# Patient Record
Sex: MALE | Race: BLACK OR AFRICAN AMERICAN | URBAN - METROPOLITAN AREA
[De-identification: names, ages, dates, MRNs, and addresses within clinical notes are randomized per-mention and may not be internally consistent; named-entity substitution may affect disease eponyms.]

---

## 2024-04-01 NOTE — PROGRESS NOTES
Urology Clinic Note - New Patient    Referring Provider:  No referring provider defined for this encounter.     Primary Care Provider:  No primary care provider on file.     Chief Complaint:     Prostate cancer, UTI     HPI:     Zachary Willson is a 84 year old male with history of HTN, HLD, dementia referred for prostate cancer and UTI history.    There is minimal information in care everywhere. Patient not a good historian given dementia history. He lives in nursing home; no significant records brought today and transport not aware of his history.   In CE: there is mention of prostate cancer and a history of UTI in 2022.     Pts daughter was called; she is unsure why he is seeing us. He has a history of UTI and prostate cancer; [facility]; was on Lupron at that time and PSA has been stable. Patient apparently had radiation for prostate cancer but daughter unsure of details.   [facility] and has [facility] in June and apparently todays appt was made in error.   He is unable to void right now. Per patient and daughter no active urinary complaints. Has been voiding without issue at nursing home.  No hematuria per family.     Random bladder scan 170cc.   Unable to give sample.        PSA:  No results found for: \"PSA\", \"PERCENTPSA\", \"PSAS\", \"PSAULTRA\", \"QPSA\", \"PSATOT\", \"TOTPSADX\", \"TOTPSASCREEN\"   No Cr or GFR on file.      History:   No past medical history on file.    No past surgical history on file.    No family history on file.         Medications (Active prior to today's visit):  No current outpatient medications on file.       Allergies:  Not on File      Review of Systems:   A comprehensive 10-point review of systems was completed.  Pertinent positives and negatives are noted in the the HPI.    Physical Exam:   CONSTITUTIONAL: In wheelchair   NEUROLOGIC: Alert    HEAD: Normocephalic, atraumatic  ENT: Hearing intact   RESPIRATORY: Normal respiratory effort  SKIN: No evident  rashes  ABDOMEN: Soft, non-tender, non-distended,     No results found.      Assessment & Plan:     Zachary Willson is a 84 year old male with history of HTN, HLD, dementia referred for prostate cancer and UTI history.    Minimal records available today and patient with dementia and nursing home staff not aware of history.   Discussed daughter. Has hx of prostate cancer and UTI. [facility] and will be seen there next month. Has no urinary complaints today and daughter had no concerns. She wants him to continue [facility].   I did offer ongoing management with us if they decide; however they will need to provide [facility] records. For now, family wants to proceed with [facility]. They will call us with any changes.       Thank you for this consult.    I have personally reviewed all relevant medical records, labs, and imaging.      In total, 30 minutes were spent on this patient encounter (including chart review, patient history, physical, and counseling, documentation, and communication)- this includes calling patients family for ancillary history           Timo Tejada MD  Staff Urologist  [facility]  Office: [phone]

## 2024-04-03 ENCOUNTER — OFFICE VISIT (OUTPATIENT)
Dept: SURGERY | Facility: CLINIC | Age: 84
End: 2024-04-03

## 2024-04-03 DIAGNOSIS — C61 PROSTATE CANCER (HCC): Primary | ICD-10-CM

## 2024-04-03 PROCEDURE — 99203 OFFICE O/P NEW LOW 30 MIN: CPT | Performed by: UROLOGY

## 2024-04-03 RX ORDER — LISINOPRIL 20 MG/1
20 TABLET ORAL DAILY
COMMUNITY

## 2024-04-03 RX ORDER — AMLODIPINE BESYLATE AND BENAZEPRIL HYDROCHLORIDE 5; 10 MG/1; MG/1
1 CAPSULE ORAL DAILY PRN
COMMUNITY

## 2024-04-03 RX ORDER — ATORVASTATIN CALCIUM 40 MG/1
40 TABLET, FILM COATED ORAL NIGHTLY
COMMUNITY

## 2024-04-03 RX ORDER — POTASSIUM CHLORIDE 1.5 G/1.58G
20 POWDER, FOR SOLUTION ORAL DAILY
COMMUNITY

## 2024-04-03 RX ORDER — MELATONIN
1000 DAILY
COMMUNITY

## 2024-04-03 RX ORDER — ASPIRIN 81 MG/1
81 TABLET ORAL DAILY
COMMUNITY

## 2024-04-03 RX ORDER — HYDRALAZINE HYDROCHLORIDE 50 MG/1
50 TABLET, FILM COATED ORAL 3 TIMES DAILY
COMMUNITY

## 2024-04-03 RX ORDER — FUROSEMIDE 40 MG/1
40 TABLET ORAL DAILY
COMMUNITY

## 2024-11-12 ENCOUNTER — APPOINTMENT (OUTPATIENT)
Dept: GENERAL RADIOLOGY | Facility: HOSPITAL | Age: 84
End: 2024-11-12
Attending: EMERGENCY MEDICINE
Payer: MEDICARE

## 2024-11-12 ENCOUNTER — APPOINTMENT (OUTPATIENT)
Dept: CT IMAGING | Facility: HOSPITAL | Age: 84
End: 2024-11-12
Attending: EMERGENCY MEDICINE
Payer: MEDICARE

## 2024-11-12 ENCOUNTER — HOSPITAL ENCOUNTER (OUTPATIENT)
Facility: HOSPITAL | Age: 84
Setting detail: OBSERVATION
Discharge: NURSING, REHAB OR LONG-TERM CARE | End: 2024-11-15
Attending: EMERGENCY MEDICINE | Admitting: INTERNAL MEDICINE
Payer: MEDICARE

## 2024-11-12 DIAGNOSIS — E87.0 HYPERNATREMIA: ICD-10-CM

## 2024-11-12 DIAGNOSIS — N17.9 ACUTE RENAL FAILURE, UNSPECIFIED ACUTE RENAL FAILURE TYPE (HCC): ICD-10-CM

## 2024-11-12 DIAGNOSIS — N39.0 URINARY TRACT INFECTION WITHOUT HEMATURIA, SITE UNSPECIFIED: ICD-10-CM

## 2024-11-12 DIAGNOSIS — A41.9 SEPSIS WITH HYPOTENSION (HCC): Primary | ICD-10-CM

## 2024-11-12 DIAGNOSIS — I95.9 SEPSIS WITH HYPOTENSION (HCC): Primary | ICD-10-CM

## 2024-11-12 PROBLEM — R73.9 HYPERGLYCEMIA: Status: ACTIVE | Noted: 2024-11-12

## 2024-11-12 PROBLEM — D64.9 ANEMIA: Status: ACTIVE | Noted: 2024-11-12

## 2024-11-12 LAB
ALBUMIN SERPL-MCNC: 4 G/DL (ref 3.2–4.8)
ALBUMIN/GLOB SERPL: 1.1 {RATIO} (ref 1–2)
ALP LIVER SERPL-CCNC: 79 U/L
ALT SERPL-CCNC: 15 U/L
ANION GAP SERPL CALC-SCNC: 2 MMOL/L (ref 0–18)
APTT PPP: 30.7 SECONDS (ref 23–36)
AST SERPL-CCNC: 20 U/L (ref ?–34)
BASOPHILS # BLD AUTO: 0.05 X10(3) UL (ref 0–0.2)
BASOPHILS NFR BLD AUTO: 0.6 %
BILIRUB SERPL-MCNC: 0.4 MG/DL (ref 0.2–1.1)
BILIRUB UR QL STRIP.AUTO: NEGATIVE
BUN BLD-MCNC: 57 MG/DL (ref 9–23)
CALCIUM BLD-MCNC: 9.2 MG/DL (ref 8.7–10.4)
CHLORIDE SERPL-SCNC: 117 MMOL/L (ref 98–112)
CO2 SERPL-SCNC: 28 MMOL/L (ref 21–32)
CREAT BLD-MCNC: 2.88 MG/DL
EGFRCR SERPLBLD CKD-EPI 2021: 21 ML/MIN/1.73M2 (ref 60–?)
EOSINOPHIL # BLD AUTO: 0.11 X10(3) UL (ref 0–0.7)
EOSINOPHIL NFR BLD AUTO: 1.4 %
ERYTHROCYTE [DISTWIDTH] IN BLOOD BY AUTOMATED COUNT: 14.4 %
GLOBULIN PLAS-MCNC: 3.7 G/DL (ref 2–3.5)
GLUCOSE BLD-MCNC: 150 MG/DL (ref 70–99)
GLUCOSE BLD-MCNC: 152 MG/DL (ref 70–99)
GLUCOSE UR STRIP.AUTO-MCNC: NORMAL MG/DL
GRAN CASTS #/AREA URNS LPF: PRESENT /LPF
HCT VFR BLD AUTO: 34.9 %
HGB BLD-MCNC: 11.3 G/DL
HYALINE CASTS #/AREA URNS AUTO: PRESENT /LPF
IMM GRANULOCYTES # BLD AUTO: 0.02 X10(3) UL (ref 0–1)
IMM GRANULOCYTES NFR BLD: 0.2 %
INR BLD: 1.19 (ref 0.8–1.2)
KETONES UR STRIP.AUTO-MCNC: NEGATIVE MG/DL
LACTATE SERPL-SCNC: 0.9 MMOL/L (ref 0.5–2)
LACTATE SERPL-SCNC: 2.2 MMOL/L (ref 0.5–2)
LEUKOCYTE ESTERASE UR QL STRIP.AUTO: 250
LYMPHOCYTES # BLD AUTO: 1.43 X10(3) UL (ref 1–4)
LYMPHOCYTES NFR BLD AUTO: 17.8 %
MCH RBC QN AUTO: 29.8 PG (ref 26–34)
MCHC RBC AUTO-ENTMCNC: 32.4 G/DL (ref 31–37)
MCV RBC AUTO: 92.1 FL
MONOCYTES # BLD AUTO: 0.51 X10(3) UL (ref 0.1–1)
MONOCYTES NFR BLD AUTO: 6.3 %
NEUTROPHILS # BLD AUTO: 5.93 X10 (3) UL (ref 1.5–7.7)
NEUTROPHILS # BLD AUTO: 5.93 X10(3) UL (ref 1.5–7.7)
NEUTROPHILS NFR BLD AUTO: 73.7 %
NITRITE UR QL STRIP.AUTO: NEGATIVE
OSMOLALITY SERPL CALC.SUM OF ELEC: 323 MOSM/KG (ref 275–295)
PH UR STRIP.AUTO: 5.5 [PH] (ref 5–8)
PLATELET # BLD AUTO: 177 10(3)UL (ref 150–450)
POTASSIUM SERPL-SCNC: 5 MMOL/L (ref 3.5–5.1)
PROT SERPL-MCNC: 7.7 G/DL (ref 5.7–8.2)
PROT UR STRIP.AUTO-MCNC: 50 MG/DL
PROTHROMBIN TIME: 15.3 SECONDS (ref 11.6–14.8)
Q-T INTERVAL: 422 MS
QRS DURATION: 90 MS
QTC CALCULATION (BEZET): 474 MS
R AXIS: -43 DEGREES
RBC # BLD AUTO: 3.79 X10(6)UL
RBC #/AREA URNS AUTO: >10 /HPF
SARS-COV-2 RNA RESP QL NAA+PROBE: DETECTED
SODIUM SERPL-SCNC: 147 MMOL/L (ref 136–145)
SP GR UR STRIP.AUTO: 1.02 (ref 1–1.03)
T AXIS: -1 DEGREES
TROPONIN I SERPL HS-MCNC: 10 NG/L
UROBILINOGEN UR STRIP.AUTO-MCNC: NORMAL MG/DL
VENTRICULAR RATE: 76 BPM
WBC # BLD AUTO: 8.1 X10(3) UL (ref 4–11)
WBC #/AREA URNS AUTO: >50 /HPF

## 2024-11-12 PROCEDURE — 99223 1ST HOSP IP/OBS HIGH 75: CPT | Performed by: INTERNAL MEDICINE

## 2024-11-12 PROCEDURE — 70450 CT HEAD/BRAIN W/O DYE: CPT | Performed by: EMERGENCY MEDICINE

## 2024-11-12 PROCEDURE — 71045 X-RAY EXAM CHEST 1 VIEW: CPT | Performed by: EMERGENCY MEDICINE

## 2024-11-12 RX ORDER — ACETAMINOPHEN 500 MG
500 TABLET ORAL EVERY 4 HOURS PRN
Status: DISCONTINUED | OUTPATIENT
Start: 2024-11-12 | End: 2024-11-15

## 2024-11-12 RX ORDER — SODIUM PHOSPHATE, DIBASIC AND SODIUM PHOSPHATE, MONOBASIC 7; 19 G/230ML; G/230ML
1 ENEMA RECTAL ONCE AS NEEDED
Status: DISCONTINUED | OUTPATIENT
Start: 2024-11-12 | End: 2024-11-15

## 2024-11-12 RX ORDER — METOCLOPRAMIDE HYDROCHLORIDE 5 MG/ML
5 INJECTION INTRAMUSCULAR; INTRAVENOUS EVERY 8 HOURS PRN
Status: DISCONTINUED | OUTPATIENT
Start: 2024-11-12 | End: 2024-11-15

## 2024-11-12 RX ORDER — ATORVASTATIN CALCIUM 40 MG/1
40 TABLET, FILM COATED ORAL NIGHTLY
Status: DISCONTINUED | OUTPATIENT
Start: 2024-11-12 | End: 2024-11-15

## 2024-11-12 RX ORDER — ENOXAPARIN SODIUM 100 MG/ML
30 INJECTION SUBCUTANEOUS DAILY
Status: DISCONTINUED | OUTPATIENT
Start: 2024-11-13 | End: 2024-11-15

## 2024-11-12 RX ORDER — POLYETHYLENE GLYCOL 3350 17 G/17G
17 POWDER, FOR SOLUTION ORAL DAILY PRN
Status: DISCONTINUED | OUTPATIENT
Start: 2024-11-12 | End: 2024-11-15

## 2024-11-12 RX ORDER — BISACODYL 10 MG
10 SUPPOSITORY, RECTAL RECTAL
Status: DISCONTINUED | OUTPATIENT
Start: 2024-11-12 | End: 2024-11-15

## 2024-11-12 RX ORDER — SODIUM CHLORIDE 9 MG/ML
INJECTION, SOLUTION INTRAVENOUS CONTINUOUS
Status: DISCONTINUED | OUTPATIENT
Start: 2024-11-12 | End: 2024-11-13

## 2024-11-12 RX ORDER — ASPIRIN 81 MG/1
81 TABLET ORAL DAILY
Status: DISCONTINUED | OUTPATIENT
Start: 2024-11-13 | End: 2024-11-15

## 2024-11-12 RX ORDER — SENNOSIDES 8.6 MG
17.2 TABLET ORAL NIGHTLY PRN
Status: DISCONTINUED | OUTPATIENT
Start: 2024-11-12 | End: 2024-11-15

## 2024-11-12 RX ORDER — ONDANSETRON 2 MG/ML
4 INJECTION INTRAMUSCULAR; INTRAVENOUS EVERY 6 HOURS PRN
Status: DISCONTINUED | OUTPATIENT
Start: 2024-11-12 | End: 2024-11-15

## 2024-11-12 NOTE — ED QUICK NOTES
Patient arrives via EMS from [place]. Patient has a history of dementia,  but is typically alert and in a wheelchair. EMS states they were called for the unconscious person, with profound hypotension at 50/30 upon their arrival. Two Ivs initated and IV fluids started at bolus rate. Patient became more alert while en route. Patient knows his name, first and last. Doesn't provide much history, looks around upon arrival. Per EMS, patient was hypoxic at 86% RA upon arrival, was placed on 2L O2 via NC. Patient appears to have contractures of BLE. Diapered.

## 2024-11-12 NOTE — ED PROVIDER NOTES
Patient Seen in: [facility] Emergency Department      History     Chief Complaint   Patient presents with    Hypotension     Stated Complaint: hypotensive, ams    Subjective:   HPI      Patient presents with altered mental status and hypotension.  The patient is a resident at [facility].  He is normally awake and conversant.  He was seen earlier in the day and seemed to be his normal self.  He was found 20 minutes prior to arrival to be unresponsive and hypotensive.  He was administered IV fluid by EMS send his mental status is improved but he remains confused.  There is no other information available.    Objective:     Past Medical History:    Adult failure to thrive    CAD (coronary artery disease)    CKD (chronic kidney disease) stage 3, GFR 30-59 ml/min (HCC)    Dementia (HCC)    Osteoarthritis    Prostate cancer (HCC)    PTSD (post-traumatic stress disorder)    Thrombocytopenia (HCC)              History reviewed. No pertinent surgical history.             Social History     Socioeconomic History    Marital status: Life Partner   Tobacco Use    Smoking status: Unknown   Substance and Sexual Activity    Alcohol use: Not Currently    Drug use: Never     Social Drivers of Health      Received from [facility], [facility]    Housing Stability                  Physical Exam     ED Triage Vitals [11/12/24 1257]   /64   Pulse 75   Resp 19   Temp 98 °F (36.7 °C)   Temp src Oral   SpO2 100 %   O2 Device Nasal cannula       Current Vitals:   Vital Signs  BP: 102/61  Pulse: 61  Resp: 15  Temp: 98 °F (36.7 °C)  Temp src: Oral  MAP (mmHg): 74    Oxygen Therapy  SpO2: 96 %  O2 Device: Nasal cannula  O2 Flow Rate (L/min): 2 L/min        Physical Exam  General: Alert and oriented x1, follows commands, in no acute distress.  HEENT: Normocephalic, atraumatic, pupils equal round and reactive to light, oropharynx clear, mucous membranes dry.  Neck: Supple.  Cardiovascular: Regular  rate and rhythm.  Respiratory: Lungs clear to auscultation.  Abdomen: Soft, nontender, no rebound or guarding, normal active bowel sounds, no CVA tenderness.  Extremities: No edema, slightly contracted lower extremities, intact pulses in all extremities.  Skin: Warm and dry.  Neurologic: Cranial nerves intact.  Moves all extremities to command, slightly weaker  in the right hand as compared to the left, unable to lift either leg off the bed, moves toes up and down to command.    ED Course     Labs Reviewed   CBC WITH DIFFERENTIAL WITH PLATELET - Abnormal; Notable for the following components:       Result Value    RBC 3.79 (*)     HGB 11.3 (*)     HCT 34.9 (*)     All other components within normal limits   COMP METABOLIC PANEL (14) - Abnormal; Notable for the following components:    Glucose 152 (*)     Sodium 147 (*)     Chloride 117 (*)     BUN 57 (*)     Creatinine 2.88 (*)     Calculated Osmolality 323 (*)     eGFR-Cr 21 (*)     Globulin  3.7 (*)     All other components within normal limits   PROTHROMBIN TIME (PT) - Abnormal; Notable for the following components:    PT 15.3 (*)     All other components within normal limits   LACTIC ACID, PLASMA - Abnormal; Notable for the following components:    Lactic Acid 2.2 (*)     All other components within normal limits   URINALYSIS, ROUTINE - Abnormal; Notable for the following components:    Urine Color Tessa (*)     Clarity Urine Ex.Turbid (*)     Blood Urine 3+ (*)     Protein Urine 50 (*)     Leukocyte Esterase Urine 250 (*)     WBC Urine >50 (*)     RBC Urine >10 (*)     Squamous Epi. Cells Few (*)     Hyaline Casts Present (*)     Granular Casts Present (*)     All other components within normal limits    Narrative:     reviewed   POCT GLUCOSE - Abnormal; Notable for the following components:    POC Glucose 150 (*)     All other components within normal limits   RAPID SARS-COV-2 BY PCR - Abnormal; Notable for the following components:    Rapid SARS-CoV-2 by  PCR Detected (*)     All other components within normal limits   PTT, ACTIVATED - Normal   TROPONIN I HIGH SENSITIVITY - Normal   LACTIC ACID REFLEX POST POSTIVE - Normal   RAINBOW DRAW LAVENDER   RAINBOW DRAW LIGHT GREEN   RAINBOW DRAW BLUE   BLOOD CULTURE   BLOOD CULTURE     EKG    Rate, intervals and axes as noted on EKG Report.  Rate: 76  Rhythm: Baseline artifact noted, suspect sinus rhythm  Reading: Left axis deviation, septal Q waves, no acute ischemic abnormality         I personally reviewed the chest films and no focal infiltrate noted.     CT BRAIN OR HEAD (CPT=70450)    Result Date: 11/12/2024  PROCEDURE:  CT BRAIN OR HEAD (56662)  COMPARISON:  None.  INDICATIONS:  unresponsive, leg weakness  TECHNIQUE:  Noncontrast CT scanning is performed through the brain. Dose reduction techniques were used. Dose information is transmitted to the ACR (American College of Radiology) NRDR (National Radiology Data Registry) which includes the Dose Index Registry.  PATIENT STATED HISTORY: (As transcribed by Technologist)  Patient arrived to ER unresponsive and hypotensive.    FINDINGS: Diffuse volume loss is noted.  Ventricles are prominent in size given the degree of volume loss.  Normal pressure hydrocephalus cannot be excluded.  There is no midline shift or mass-effect.  The basal cisterns are patent.  The gray-white matter differentiation is intact.  There is no acute intracranial hemorrhage or extra-axial fluid collection.  Hypodensities in the periventricular subcortical white matter is compatible chronic small vessel disease  There is no evident fracture.  The visualized paranasal sinuses and mastoid air cells are unremarkable.             CONCLUSION:  Ventriculomegaly appears increased given the degree of volume loss.  Normal pressure hydrocephalus cannot be excluded.  Correlate clinically..    LOCATION:  [facility]   Dictated by (CST): Alex Smith MD on 11/12/2024 at 3:33 PM     Finalized by (CST): Alex Smith,  MD on 11/12/2024 at 3:37 PM       XR CHEST AP PORTABLE  (CPT=71045)    Result Date: 11/12/2024  PROCEDURE:  XR CHEST AP PORTABLE  (CPT=71045)  TECHNIQUE:  AP chest radiograph was obtained.  COMPARISON:  None.  INDICATIONS:  hypotensive, ams  PATIENT STATED HISTORY: (As transcribed by Technologist)  Per patients daughter - Patient is not being very responsive and not acting like himself. Patient also has low blood pressure.    FINDINGS:  The patient is slightly rotated to the right.  The heart size is within the limits of normal.  There is a tortuous, ectatic thoracic aorta with aortic calcification.  There is mild subsegmental atelectasis or scarring seen at the left lung base.  Scattered granulomatous calcified nodules are seen bilaterally.  Pleural plaques are calcified and seen bilaterally.  There is no pleural effusion or pneumothorax.  There is no pulmonary edema.  Degenerative changes are seen in the spine and both shoulders.            CONCLUSION:  1. Calcified pleural plaques are noted which could reflect changes related to asbestos related disease.  No pleural effusion or pulmonary edema. 2. Old granulomatous disease.  3. Atheromatous changes within the aorta.    LOCATION:  [facility]      Dictated by (CST): Buck Diamond MD on 11/12/2024 at 2:05 PM     Finalized by (CST): Buck Diamond MD on 11/12/2024 at 2:07 PM        Medications   sodium chloride 0.9 % IV bolus 2,178 mL (0 mL Intravenous Stopped 11/12/24 1630)   sodium chloride 0.9 % IV bolus 1,000 mL (0 mL Intravenous Stopped 11/12/24 1320)   cefTRIAXone (Rocephin) 2 g in sodium chloride 0.9% 100 mL IVPB-ADDV (0 g Intravenous Stopped 11/12/24 1530)     The patient was started on a normal saline bolus given his hypotension.  His daughter arrived and gave some additional history.  She states that he had just come out of quarantine on Sunday and she saw him that day.  She states that he was more alert at that time.  She was concerned because he  seemed to be just in bed all of the time when he was on quarantine and maybe they were checking on him as often.  He does have some baseline confusion but he seems more weak and less talkative than usual.  She states that he does have more weakness in his legs than his arms at baseline and always needs help to transfer.  He does have some degree of contraction in his legs.  Given that the patient did not seem to have an acute focal neurologic deficit he was not called as a stroke.  He was given fluids and his sepsis markers came back positive.  He appears to have a urinary tract infection and has been given IV Rocephin.  His blood pressure appears to be improving with fluids.     MDM      Patient presents with decreased responsiveness and hypotension.  Differential diagnosis includes but is not limited to intracranial hemorrhage, dehydration and sepsis.  The patient has multiple abnormalities on his chemistry panel including hypernatremia, hyperchloremia, acute renal failure and hyperglycemia.  His cardiac enzymes have come back normal.  He has a mild anemia but no leukocytosis.  His lactic acid level is elevated.  He does not have any focal pneumonia on chest x-ray.  His urine does look consistent with infection.  The patient seems to be improving in terms of his blood pressure and mentation with fluids.  He will be admitted to Dr. Duarte from the hospitalist service for close observation and further treatment.  We have discussed the case.  Daughter was updated on the findings and plan of care and is in agreement.    Admission disposition: 11/12/2024  4:00 PM           Medical Decision Making      Disposition and Plan     Clinical Impression:  1. Sepsis with hypotension (HCC)    2. Urinary tract infection without hematuria, site unspecified    3. Hypernatremia    4. Acute renal failure, unspecified acute renal failure type (HCC)         Disposition:  Admit  11/12/2024  4:00 pm    Follow-up:  No follow-up provider  specified.        Medications Prescribed:  Current Discharge Medication List        A total of 32 minutes of critical care time (exclusive of billable procedures) was administered to manage the patient's unstable vital signs due to his hypotension.  This involved direct patient intervention, complex decision making, and/or extensive discussions with the patient, family, and clinical staff.       Supplementary Documentation:     [facility]   part of [facility]      Sepsis Reassessment Note    BP 93/56   Pulse 66   Temp 98 °F (36.7 °C) (Oral)   Resp 17   Wt 72.6 kg   SpO2 94%      I completed the sepsis reassessment at 17:00    Cardiac:  Regularity: Regular  Rate: Normal  Heart Sounds: S1,S2    Lungs:   Right: Diminished  Left: Diminished    Peripheral Pulses:  Radial: Right 2+ or Left 2+      Capillary Refill:  <3 Secs    Skin:  Temp/Moisture: Warm and Dry  Color: Normal      Mireya CHAVO Lucas MD  11/12/2024  2:47 PM       Hospital Problems       Present on Admission  Date Reviewed: 4/3/2024            ICD-10-CM Noted POA    * (Principal) Sepsis with hypotension (HCC) A41.9, I95.9 11/12/2024 Unknown    Anemia D64.9 11/12/2024 Yes    Hyperglycemia R73.9 11/12/2024 Yes

## 2024-11-12 NOTE — ED INITIAL ASSESSMENT (HPI)
From [place]. Normal, awake and talking 2 hours ago. Found to be hypotensive about 20 minutes PTA. 50/30 for EMS. Improved with IV fluids.

## 2024-11-12 NOTE — ED QUICK NOTES
Patient incontinent of stool and urine in diaper. Has pressure ulcer on coccyx with some skin breakdown. Diaper changed. Patient straight-nicholas'd of brown urine with brown sediment.   Patient's daughter arrives, states the patient came out of quarantine at NH on Sunday for Covid.

## 2024-11-13 LAB
ALBUMIN SERPL-MCNC: 3.5 G/DL (ref 3.2–4.8)
ALBUMIN/GLOB SERPL: 1 {RATIO} (ref 1–2)
ALP LIVER SERPL-CCNC: 70 U/L
ALT SERPL-CCNC: 11 U/L
ANION GAP SERPL CALC-SCNC: 3 MMOL/L (ref 0–18)
AST SERPL-CCNC: 16 U/L (ref ?–34)
BILIRUB SERPL-MCNC: 0.4 MG/DL (ref 0.2–1.1)
BUN BLD-MCNC: 47 MG/DL (ref 9–23)
CALCIUM BLD-MCNC: 9.2 MG/DL (ref 8.7–10.4)
CHLORIDE SERPL-SCNC: 121 MMOL/L (ref 98–112)
CO2 SERPL-SCNC: 25 MMOL/L (ref 21–32)
CREAT BLD-MCNC: 2.09 MG/DL
EGFRCR SERPLBLD CKD-EPI 2021: 31 ML/MIN/1.73M2 (ref 60–?)
ERYTHROCYTE [DISTWIDTH] IN BLOOD BY AUTOMATED COUNT: 14.3 %
GLOBULIN PLAS-MCNC: 3.6 G/DL (ref 2–3.5)
GLUCOSE BLD-MCNC: 100 MG/DL (ref 70–99)
HCT VFR BLD AUTO: 33.9 %
HGB BLD-MCNC: 10.7 G/DL
MAGNESIUM SERPL-MCNC: 2.3 MG/DL (ref 1.6–2.6)
MCH RBC QN AUTO: 29 PG (ref 26–34)
MCHC RBC AUTO-ENTMCNC: 31.6 G/DL (ref 31–37)
MCV RBC AUTO: 91.9 FL
OSMOLALITY SERPL CALC.SUM OF ELEC: 320 MOSM/KG (ref 275–295)
PHOSPHATE SERPL-MCNC: 3.7 MG/DL (ref 2.4–5.1)
PLATELET # BLD AUTO: 154 10(3)UL (ref 150–450)
POTASSIUM SERPL-SCNC: 4.5 MMOL/L (ref 3.5–5.1)
PROT SERPL-MCNC: 7.1 G/DL (ref 5.7–8.2)
RBC # BLD AUTO: 3.69 X10(6)UL
SODIUM SERPL-SCNC: 149 MMOL/L (ref 136–145)
WBC # BLD AUTO: 5.4 X10(3) UL (ref 4–11)

## 2024-11-13 PROCEDURE — 99232 SBSQ HOSP IP/OBS MODERATE 35: CPT | Performed by: INTERNAL MEDICINE

## 2024-11-13 PROCEDURE — 99497 ADVNCD CARE PLAN 30 MIN: CPT | Performed by: INTERNAL MEDICINE

## 2024-11-13 RX ORDER — DEXTROSE MONOHYDRATE 50 MG/ML
INJECTION, SOLUTION INTRAVENOUS CONTINUOUS
Status: DISCONTINUED | OUTPATIENT
Start: 2024-11-13 | End: 2024-11-15

## 2024-11-13 RX ORDER — DOXEPIN HYDROCHLORIDE 50 MG/1
1 CAPSULE ORAL DAILY
Status: DISCONTINUED | OUTPATIENT
Start: 2024-11-13 | End: 2024-11-15

## 2024-11-13 NOTE — PLAN OF CARE
Problem: Patient/Family Goals  Goal: Patient/Family Long Term Goal  Description: Patient's Long Term Goal: discharge home    Interventions:  - Consult to Hosp  - IVF, IV Abx  - Maintain stable BP  - NPO until speech eval  - PT/OT eval   - See additional Care Plan goals for specific interventions  Outcome: Progressing  Goal: Patient/Family Short Term Goal  Description: Patient's Short Term Goal: maintain stable BP     Interventions:   - Consult to Hosp  - IVF, IV Abx  - NPO until speech eval  - PT/OT eval  - Pain management   - See additional Care Plan goals for specific interventions  Outcome: Progressing     Problem: PAIN - ADULT  Goal: Verbalizes/displays adequate comfort level or patient's stated pain goal  Description: INTERVENTIONS:  - Encourage pt to monitor pain and request assistance  - Assess pain using appropriate pain scale  - Administer analgesics based on type and severity of pain and evaluate response  - Implement non-pharmacological measures as appropriate and evaluate response  - Consider cultural and social influences on pain and pain management  - Manage/alleviate anxiety  - Utilize distraction and/or relaxation techniques  - Monitor for opioid side effects  - Notify MD/LIP if interventions unsuccessful or patient reports new pain  - Anticipate increased pain with activity and pre-medicate as appropriate  Outcome: Progressing     Problem: RISK FOR INFECTION - ADULT  Goal: Absence of fever/infection during anticipated neutropenic period  Description: INTERVENTIONS  - Monitor WBC  - Administer growth factors as ordered  - Implement neutropenic guidelines  Outcome: Progressing     Problem: SAFETY ADULT - FALL  Goal: Free from fall injury  Description: INTERVENTIONS:  - Assess pt frequently for physical needs  - Identify cognitive and physical deficits and behaviors that affect risk of falls.  - [place] fall precautions as indicated by assessment.  - Educate pt/family on patient safety including  physical limitations  - Instruct pt to call for assistance with activity based on assessment  - Modify environment to reduce risk of injury  - Provide assistive devices as appropriate  - Consider OT/PT consult to assist with strengthening/mobility  - Encourage toileting schedule  Outcome: Progressing     Problem: DISCHARGE PLANNING  Goal: Discharge to home or other facility with appropriate resources  Description: INTERVENTIONS:  - Identify barriers to discharge w/pt and caregiver  - Include patient/family/discharge partner in discharge planning  - Arrange for needed discharge resources and transportation as appropriate  - Identify discharge learning needs (meds, wound care, etc)  - Arrange for interpreters to assist at discharge as needed  - Consider post-discharge preferences of patient/family/discharge partner  - Complete POLST form as appropriate  - Assess patient's ability to be responsible for managing their own health  - Refer to Case Management Department for coordinating discharge planning if the patient needs post-hospital services based on physician/LIP order or complex needs related to functional status, cognitive ability or social support system  Outcome: Progressing

## 2024-11-13 NOTE — OCCUPATIONAL THERAPY NOTE
OCCUPATIONAL THERAPY EVALUATION - INPATIENT    Room Number: 512/512-A  Evaluation Date: 11/13/2024     Type of Evaluation: Initial  Presenting Problem: sepsis with hypotension, UTI    Physician Order: IP Consult to Occupational Therapy  Reason for Therapy:  ADL/IADL Dysfunction and Discharge Planning  History related to admission: Patient with recent COVID-19 infection presented from [facility] facility with decreased responsiveness. Found to have sepsis and UTI.    OCCUPATIONAL THERAPY ASSESSMENT   Patient is a 84 year old male admitted on 11/12/2024 with Presenting Problem: sepsis with hypotension, UTI. Co-Morbidities : PTSD, CKD, CAD,  dementia, h/o prostate cancer  Patient is currently functioning at baseline with toileting, bathing, upper body dressing, lower body dressing, grooming, bed mobility, transfers, static sitting balance, and dynamic sitting balance.  Prior to admission, patient's baseline is max to total assist for ADLs and transfers per EMR.   Patient met all OT goals at baseline ( MAX to DEP ) level.  Patient reports no further questions/concerns at this time.     Patient will benefit from continued skilled OT Services return to long term care with restorative therapies    Recommendations for nursing staff:   Transfers: sit to stand lift, 2 ppl for safety  Toileting location: Toilet    EVALUATION SESSION:  Patient at start of session: supine    FUNCTIONAL TRANSFER ASSESSMENT  Sit to Stand: Edge of Bed  Edge of Bed: Maximum Assist    BED MOBILITY  Supine to Sit : Maximum Assist  Sit to Supine (OT): Maximum Assist  Scooting: MAX A    BALANCE ASSESSMENT  Static Sitting: Maximum Assist    FUNCTIONAL ADL ASSESSMENT  Grooming Seated: Maximum Assist    ACTIVITY TOLERANCE: BP stable with bed mobility and transfer to chair mode           BP: 133/73  BP Location: Left arm  BP Method: Automatic  Patient Position: Semi-Krishna    O2 SATURATIONS       COGNITION  Arousal/Alertness:  delayed responses to stimuli,  inconsistent responses to stimuli, and delays in processing ; also does not answer approx 75% of questions  Attention Span:  difficulty attending to directions  Orientation Level:  oriented to person  Following Commands:  follows one-step commands inconsistently  Initiation: cues to initiate tasks and hand over hand to initiate tasks  Motor Planning: impaired  Awareness of Errors:  decreased awareness of errors   Awareness of Deficits:  decreased awareness of deficits      COGNITION ASSESSMENTS     Upper Extremity:   ROM: Impaired bilateral shoulder flexion to approx 1/2 range; impaired end range elbow extension/ ( ?mild contractures); able to supinate/pronate, wrists and fingers grossly WFL  Strength: difficult to assess due to patient's cognition; grossly 3-/5 shoulders, 4-/5  and elbows  Coordination:  Gross motor: impaired   Fine motor: impaired; tremors  Sensation:  no evidence of deficits; patient unable to report    EDUCATION PROVIDED  Patient Education : Functional Transfer Techniques; Role of Occupational Therapy; Plan of Care; Posture/Positioning  Patient's Response to Education: Demonstrates Poor Carry Over to Information    Equipment used: gait belt, RW, bed functions  Demonstrates functional use    Therapist comments: Patient needed significant increased time for all processing and responds to instructions or questions approx 25% of time. Assisted patient with supine to sit, scooting hips, and and sit to stand x2 at RW. Patient able to initiate lifting BLEs into supine. DEP A to boost up in bed. BEd placed in chair mode. BP stable. Patient able to grasp tissue and wipe nose with set-up and incr time.     Patient End of Session: RN aware of session/findings;Call light within reach;Needs met;In bed;Hospital anti-slip socks;All patient questions and concerns addressed;Alarm set;Other (Comment) (bed in chair position)    OCCUPATIONAL PROFILE    HOME SITUATION  Type of Home: Skilled nursing facility  ([facility])  Home Layout: One level  Lives With: Staff 24 hours                     Drives: No       Prior Level of Function: Per EMR, patient has needed at least MAX A for ADLs since June 2022. He resides at [facility]. Patient is a poor historian. He states that he stays in bed and does not get into a WC at [facility]. This writer attempted to call facility but there was no answer.    SUBJECTIVE  Flat affect, cooperative, mostly non-verbal    PAIN ASSESSMENT  Rating: Other (Comment) (denied pain, did not show evidence of pain)          OBJECTIVE  Precautions: Bed/chair alarm  Fall Risk: High fall risk    WEIGHT BEARING RESTRICTION       AM-PAC ‘6-Clicks’ Inpatient Daily Activity Short Form  -   Putting on and taking off regular lower body clothing?: A Lot  -   Bathing (including washing, rinsing, drying)?: A Lot  -   Toileting, which includes using toilet, bedpan or urinal? : A Lot  -   Putting on and taking off regular upper body clothing?: A Lot  -   Taking care of personal grooming such as brushing teeth?: A Lot  -   Eating meals?: A Lot    AM-PAC Score:  Score: 12  Approx Degree of Impairment: 66.57%  Standardized Score (AM-PAC Scale): 30.6    ADDITIONAL TESTS     NEUROLOGICAL FINDINGS      PLAN   Patient has been evaluated and presents with no skilled Occupational Therapy needs at this time.  Patient discharged from Occupational Therapy services.  Please re-order if a new functional limitation presents during this admission.       Patient Evaluation Complexity Level:   Occupational Profile/Medical History MODERATE - Expanded review of history including review of medical or therapy record   Specific performance deficits impacting engagement in ADL/IADL MODERATE  3 - 5 performance deficits   Client Assessment/Performance Deficits MODERATE - Comorbidities and min to mod modifications of tasks    Clinical Decision Making LOW - Analysis of occupational profile, problem-focused  assessments, limited treatment options    Overall Complexity LOW     OT Session Time: 25 minutes    Therapeutic Activity: 22 minutes

## 2024-11-13 NOTE — PROGRESS NOTES
Oriented to self. Only following simple, 1 step commands. Room air. Lethargic. Regular diet/nectar thick liquids. Rounded on Q hour and PRN.

## 2024-11-13 NOTE — PROGRESS NOTES
Patient A&O X 1. Delayed responses. Room air. IV Abx. NSR/SB on Tele. Lovenox. Primofit and briefed. . IVF. Sacral wound, mepilex in place. Patient failed dysphagia screening, NPO until speech eval. Wheelchair bound at baseline per daughter. PT/OT to see. No further needs at this time.

## 2024-11-13 NOTE — SPIRITUAL CARE NOTE
Spiritual Care Visit Note    Patient Name: Zachary Willson Date of Spiritual Care Visit: 24   : 1/3/1940 Primary Dx: Sepsis with hypotension (HCC)       Referred By:      Spiritual Care Taxonomy:         Methods: Collaborate with care team member    Interventions: Active listening;Ask guided questions    Visit Type/Summary:     - Spiritual Care: Consulted with RN prior to visit. Provided information regarding how to contact Spiritual Care and left a Spiritual Care information card.  remains available as needed for follow up. Spoke with the RN and was informed that the patient is unable to complete a POA form. Patient is  to engage in conversation. There was no family present.      Spiritual Care support can be requested via an Epic consult. For urgent/immediate needs, please contact the On Call  at: Armando: ext [phone]           Chaplain Resident Ange Canela MA

## 2024-11-13 NOTE — PROGRESS NOTES
NURSING ADMISSION NOTE      Patient admitted via Cart  Oriented to room.  Safety precautions initiated.  Bed in low position.  Call light in reach.    Admission toma complete. PTA med list complete. From [facility].  Pt A&Ox1, Hx dementia. RA. Tele, NSR, pvcs. Incontinent x2, briefed. Blood on brief from st cath. Straight cathed in ED. Bladder scans?  Wheelchair bound at baseline. Redness/wound on sacrum, inserted photo into media. Regular diet at home. Awaiting orders. MD notified.

## 2024-11-13 NOTE — DIETARY NOTE
[facility]   part of [facility]    NUTRITION ASSESSMENT    Unable to diagnose malnutrition criteria at this time.    NUTRITION INTERVENTION:    RD nutrition Care Plan- See RD nutrition assessment for additional recommendations  Meal and Snacks - Monitor and encourage adequate PO intake.   Medical Food Supplements - Magic Cup BID. Rationale/use for oral supplements discussed.  Vitamin and Mineral Supplements - adding Multivitamin with minerals    PATIENT STATUS: 11/13/24 Pt is an 84 year old male admitted with sepsis. RD screened due to wound. No family present upon visit. +dementia. Pt confused. Coccyx wound, unstaged. RD added Magic Cup Bid. Pt with wasting present. No wt history or po info available. RD to follow for po, ons, wts.     PMH: HTN, dementia, CKD stage 3, prostate cancer    ANTHROPOMETRICS:  Ht: 180.3 cm (5' 10.98\")  Wt: 66 kg (145 lb 6.4 oz).   BMI: Body mass index is 20.29 kg/m².  IBW: 76 kg    WEIGHT HISTORY:   Wt Readings from Last 10 Encounters:   11/12/24 66 kg (145 lb 6.4 oz)      NUTRITION:  Diet:       Procedures    Regular/General diet Fluid Consistency: Nectar Thick / Mildly Thick Liquids; Is Patient on Accuchecks? No      Food Allergies: No  Cultural/Ethnic/Cheondoism Preferences Addressed: Yes    Percent Meals Eaten (last 3 days)       None          GI system review: WNL    Skin and wounds: coccyx    NUTRITION RELATED PHYSICAL FINDINGS:     1. Body Fat/Muscle Mass: moderate depletion body fat Orbital fat pad, Buccal fat pad, and Midaxillary line and moderate muscle depletion Temple region and Clavicle region     2. Fluid Accumulation: none    NUTRITION PRESCRIPTION:  66 kg Actual Body Weight  Calories: 8541-0924 calories/day (30-35 kcal/kg)  Protein: 79-99 grams protein/day (1.2-1.5 grams protein per kg)  Fluid: ~1 ml/kcal or per MD discretion    NUTRITION DIAGNOSIS/PROBLEM:  Increased nutrient needs related to physiological causes and increased nutritional demands for healing as  evidenced by loss of fat mass, loss of muscle mass, and wound    MONITOR AND EVALUATE/NUTRITION GOALS:  PO intake of 75% of meals TID - New  PO intake of 75% of oral nutrition supplement/s - New  Weight stable within 1 to 2 lbs during admission - New    MEDICATIONS:  Reviewed    LABS:  Reviewed    Pt is at High nutrition risk    Alicia Rowell RD, LDN  Clinical Dietitian

## 2024-11-13 NOTE — PHYSICAL THERAPY NOTE
PHYSICAL THERAPY EVALUATION - INPATIENT     Room Number: 512/512-A  Evaluation Date: 11/13/2024  Type of Evaluation: Initial  Physician Order: PT Eval and Treat    Presenting Problem: Sepsis w/ hypotension, UTI, Covid 19  Co-Morbidities : Dementia, HTN, CKD, HL, prostate ca s/p radiation  Reason for Therapy: Mobility Dysfunction and Discharge Planning    PHYSICAL THERAPY ASSESSMENT   Patient is a 84 year old male admitted 11/12/2024 for episode of unresponsiveness at [facility].   Patient is currently functioning near baseline with bed mobility and transfers. Prior to admission, patient's baseline is dependent for care - unsure of transfer status, but suspect they use mechanical lift due to pt's sacral wound, limb tightness/borderline contracted and per chart w/c bound at baseline.  OT attempted call to [facility], but there has been no answer. Attempts by staff to call family as well.     Patient will benefit from continued skilled PT Services return to long term care with restorative therapies.    PLAN  Patient has been evaluated and presents with no skilled Physical Therapy needs at this time.  Patient discharged from Physical Therapy services.  Please re-order if a new functional limitation presents during this admission.    PT Device Recommendation: Mechanical lift;Hospital bed    GOALS  Patient was able to achieve the following goals ...    Patient was able to transfer Unable before admission without assist   Patient able to ambulate on level surfaces Unable before admission     HOME SITUATION  Type of Home: Skilled nursing facility  Home Layout: One level  Stairs to Enter : 0        Stairs to Bedroom: 0         Lives With: Staff 24 hours    Drives: No         Prior Level of Martin: Per chart pt is long term resident at [facility] and is w/c bound at baseline.    SUBJECTIVE  Pt reports feeling ok.  Very limited verbalizations during session, but when given extra time was able to follow some  commands and answer some  questions.      OBJECTIVE  Precautions: Bed/chair alarm  Fall Risk: High fall risk    WEIGHT BEARING RESTRICTION     PAIN ASSESSMENT  Rating: Unable to rate  Location: No evidence of pain during session, shakes head \"no\" when asked if he's in pain  Management Techniques: Activity promotion;Repositioning    COGNITION  Overall Cognitive Status:  Impaired  Arousal/Alertness:  delayed responses to stimuli, inconsistent responses to stimuli, unresponsive to stimuli, and lethargic  Attention Span:  difficulty attending to directions  Orientation Level:  oriented to person and unable to answer other orientation questions  Following Commands:  follows one-step commands inconsistently  Initiation: hand over hand to initiate tasks  Motor Planning: impaired  Awareness of Deficits:  decreased awareness of deficits    RANGE OF MOTION AND STRENGTH ASSESSMENT  Upper extremity ROM and strength -see OT evaluation    Lower extremity ROM - Knees passively w/I 3/4 range, ankles passively to neutral dorsiflexion, hips - very rigid not allowing much hip ab/ad    Lower extremity strength - could not follow commands for MMT, limited rom.  Was able to bear weight through le's.    BALANCE  Static Sitting: Poor +  Dynamic Sitting: Poor +  Static Standing: Poor -  Dynamic Standing: Not tested    ADDITIONAL TESTS                                    ACTIVITY TOLERANCE  Pulse: 63  Heart Rate Source: Monitor     BP: 133/73  BP Location: Left arm  BP Method: Automatic  Patient Position: Semi-Krishna    O2 WALK       NEUROLOGICAL FINDINGS                        AM-PAC '6-Clicks' INPATIENT SHORT FORM - BASIC MOBILITY  How much difficulty does the patient currently have...  Patient Difficulty: Turning over in bed (including adjusting bedclothes, sheets and blankets)?: A Lot   Patient Difficulty: Sitting down on and standing up from a chair with arms (e.g., wheelchair, bedside commode, etc.): A Lot   Patient Difficulty: Moving from lying on back to  sitting on the side of the bed?: A Lot   How much help from another person does the patient currently need...   Help from Another: Moving to and from a bed to a chair (including a wheelchair)?: Total   Help from Another: Need to walk in hospital room?: Total   Help from Another: Climbing 3-5 steps with a railing?: Total       AM-PAC Score:  Raw Score: 9   Approx Degree of Impairment: 81.38%   Standardized Score (AM-PAC Scale): 30.55   CMS Modifier (G-Code): CM    FUNCTIONAL ABILITY STATUS  Gait Assessment   Functional Mobility/Gait Assessment  Gait Assistance: Not tested    Skilled Therapy Provided   Completed prom b le's 5 reps each side for heel slides only.    Bed Mobility:  Rolling: Left w/ mod I  Supine to sit: Mod I for both trunk and le's. Denied dizziness upon sitting, did require hands on to prevent lob, but only CGA.  Sit to supine: Min a of 1 for le's only.     Transfer Mobility:  Sit to stand: Mod-Max a of 2 - performed 2x over course of session.  Pt w/ tendency toward retropulsion, le's sliding away w/o physical assist to prevent slipping despite having non-slip socks on.   Stand to sit: Mod a of 1 to control descent into sitting.  Gait = Standing balance was never good enough to have pt initiate any steps.    Therapist's comments:Transitioned pt to chair position at end of session.  Pt tolerated position well and maintained centered sitting.  Reports that he feels comfortable in position.  BP obtained and was 133/73 - consistent w/ previous readings.    Exercise/Education Provided:  Bed mobility  Functional activity tolerated  Transfer training    Patient End of Session: Needs met;In bed;Call light within reach;RN aware of session/findings;Alarm set (Kenzie Ortiz aware of eval session)    Patient Evaluation Complexity Level:  History Moderate - 1 or 2 personal factors and/or co-morbidities   Examination of body systems Moderate - addressing a total of 3 or more elements   Clinical Presentation  Moderate -  Evolving   Clinical Decision Making Moderate Complexity       PT Session Time: 24 minutes  Gait Trainin minutes  Therapeutic Activity: 16 minutes  Neuromuscular Re-education: 0 minutes  Therapeutic Exercise: 2 minutes

## 2024-11-13 NOTE — CM/SW NOTE
Patient noted to be a LTC/VA resident at [facility]. Referral sent in aidin.     Spoke with Carol from [facility] who states patient \"tested positive on 10/31 and ended his isolation on 11/11/2024\".    SW/CM to remain available for support and/or discharge planning.    Aide [place], CRISTIAN  Discharge Planner  [phone]

## 2024-11-13 NOTE — PROGRESS NOTES
[facility]   part of [facility]     Hospitalist Progress Note     Zachary Willson Patient Status:  Observation    1/3/1940 MRN [id number]   Location [facility] 5NW-A Attending Kerry Duarte MD   Hosp Day # 0 PCP King Bennett MD     Chief Complaint: Hypotension     Subjective:     Patient seen and examined   Not answering questions     Objective:    Review of Systems:   As above     Vital signs:  Temp:  [98 °F (36.7 °C)-98.6 °F (37 °C)] 98.4 °F (36.9 °C)  Pulse:  [58-79] 73  Resp:  [11-20] 20  BP: ()/(56-80) 154/80  SpO2:  [91 %-100 %] 98 %    Physical Exam:    General: No acute distress  Respiratory: No wheezes, no rhonchi  Cardiovascular: S1, S2, regular rate and rhythm  Abdomen: Soft, Non-tender, non-distended, positive bowel sounds  Neuro: No new focal deficits.   Extremities: No edema    Diagnostic Data:    Labs:  Recent Labs   Lab 24  1307 24  0642   WBC 8.1 5.4   HGB 11.3* 10.7*   MCV 92.1 91.9   .0 154.0   INR 1.19  --        Recent Labs   Lab 24  1307 24  0642   * 100*   BUN 57* 47*   CREATSERUM 2.88* 2.09*   CA 9.2 9.2   ALB 4.0 3.5   * 149*   K 5.0 4.5   * 121*   CO2 28.0 25.0   ALKPHO 79 70   AST 20 16   ALT 15 11   BILT 0.4 0.4   TP 7.7 7.1       Estimated Creatinine Clearance: 25.4 mL/min (A) (based on SCr of 2.09 mg/dL (H)).    Recent Labs   Lab 24  1307   TROPHS 10       Recent Labs   Lab 24  1307   PTP 15.3*   INR 1.19                  Microbiology    No results found for this visit on 24.      Imaging: Reviewed in Epic.    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   -discussed with daughter, can follow up but patient is clinically asymptomatic      #Abnormal brain CT, NPH cannot be excluded but suspect may be due to volume loss from prior CVA  #Cerebrovascular disease   -progressive decline over last several years since CVA in 2019 per family      #Dementia - fairly severe per daughter   #HTN - PTA meds on hold  #HLD  #Prostate cancer    #ACP  -17 minutes time spent in advanced care planning  Reviewed code status with patient's daughter   Changed in EMR to Full Code           Berta Taylor DO    Supplementary Documentation:     Quality:  DVT Mechanical Prophylaxis:     Early ambuation  DVT Pharmacologic Prophylaxis   Medication    enoxaparin (Lovenox) 30 MG/0.3ML SUBQ injection 30 mg         DVT Pharmacologic prophylaxis: Aspirin 81 mg      Code Status: Not on file  [place]: No urinary catheter in place      The 21st Century Cures Act makes medical notes like these available to patients in the interest of transparency. Please be advised this is a medical document. Medical documents are intended to carry relevant information, facts as evident, and the clinical opinion of the practitioner. The medical note is intended as peer to peer communication and may appear blunt or direct. It is written in medical language and may contain abbreviations or verbiage that are unfamiliar.

## 2024-11-13 NOTE — SLP NOTE
ADULT SWALLOWING EVALUATION    ASSESSMENT    ASSESSMENT/OVERALL IMPRESSION:  Patient is an 85 y/o male admitted with hypotension and PMHx significant for dementia, HTN, HLD, CKD, and prostate cancer. SLP order received to evaluate oropharyngeal swallow d/t failed RN dysphagia screen. Patient received alert, but confused, in bed. Patient unable to provide history re: baseline diet. Patient is previously unknown to this service. Transfer paperwork from SNF did not indicate a baseline diet.    Patient presented with suspected pharyngeal dysphagia. Bolus acceptance was adequate without evidence of anterior bolus loss. Mastication of regular solids was prolonged, but thorough. Delayed cough observed x3 following thin liquid trials. No overt s/s of aspiration observed with mildly thick liquids.    Recommend patient initiate a regular diet and mildly thick liquids. Bolus size and rate of intake should be limited. SLP will continue to follow to monitor diet tolerance and adjust as appropriate. Education provided re: results and recommendations.            RECOMMENDATIONS   Diet Recommendations - Solids: Regular  Diet Recommendations - Liquids: Nectar thick liquids/ Mildly thick                        Compensatory Strategies Recommended: Small bites and sips  Aspiration Precautions: Upright position  Medication Administration Recommendations: One pill at a time  Treatment Plan/Recommendations: Aspiration precautions    HISTORY   MEDICAL HISTORY  Reason for Referral: RN dysphagia screen    Problem List  Principal Problem:    Sepsis with hypotension (HCC)  Active Problems:    Anemia    Hyperglycemia    Urinary tract infection without hematuria, site unspecified    Hypernatremia    Acute renal failure, unspecified acute renal failure type (HCC)      Past Medical History  Past Medical History:    Adult failure to thrive    CAD (coronary artery disease)    CKD (chronic kidney disease) stage 3, GFR 30-59 ml/min (HCC)    Dementia  (HCC)    Osteoarthritis    Prostate cancer (HCC)    PTSD (post-traumatic stress disorder)    Thrombocytopenia (HCC)       Prior Living Situation: Skilled nursing facility  Diet Prior to Admission: Unknown  Precautions: Aspiration    Patient/Family Goals: none stated    SWALLOWING HISTORY  Current Diet Consistency: NPO  Dysphagia History: as above  Imaging Results:   CXR 11/12/24  CONCLUSION:    1. Calcified pleural plaques are noted which could reflect changes related to asbestos related disease.  No pleural effusion or pulmonary edema.   2. Old granulomatous disease.    3. Atheromatous changes within the aorta.          LOCATION:  [facility]                  Dictated by (CST): Buck Diamond MD on 11/12/2024 at 2:05 PM       Finalized by (CST): Buck Diamond MD on 11/12/2024 at 2:07 PM     [facility] 11/12/24  CONCLUSION:  Ventriculomegaly appears increased given the degree of volume loss.  Normal pressure hydrocephalus cannot be excluded.  Correlate clinically..            LOCATION:  [facility]         Dictated by (CST): Alex Smith MD on 11/12/2024 at 3:33 PM       Finalized by (CST): Alex Smith MD on 11/12/2024 at 3:37 PM     SUBJECTIVE       OBJECTIVE   ORAL MOTOR EXAMINATION  Dentition:  (sparse natural dentition)  Symmetry: Unable to assess  Strength: Unable to assess     Range of Motion: Unable to assess       Voice Quality: Clear     Consistencies Trialed: Thin liquids;Nectar thick liquids;Puree;Soft solid;Hard solid  Method of Presentation: Staff/Clinician assistance  Patient Positioning: Upright;Midline    Oral Phase of Swallow: Within Functional Limits                      Pharyngeal Phase of Swallow: Impaired        Laryngeal Elevation Coordination: Appears impaired  (Please note: Silent aspiration cannot be evaluated clinically. Videofluoroscopic Swallow Study is required to rule-out silent aspiration.)    Esophageal Phase of Swallow: No complaints consistent with possible esophageal  involvement  Comments: d/w RN              GOALS  Goal #1 The patient will tolerate rregular consistency and mildly thick liquids without overt signs or symptoms of aspiration with 90 % accuracy over 1-2 session(s).  In Progress   Goal #2 The patient/family/caregiver will demonstrate understanding and implementation of aspiration precautions and swallow strategies independently over 1-2 session(s).    In Progress   Goal #3 The patient will tolerate trial upgrade of NA consistency and thin liquids without overt signs or symptoms of aspiration with 90 % accuracy over 1-2 session(s).  Not Addressed   Goal #4     Goal #5     Goal #6     Goal #7     Goal #8       FOLLOW UP  Treatment Plan/Recommendations: Aspiration precautions     Follow Up Needed (Documentation Required): Yes  SLP Follow-up Date: 11/14/24    Thank you for your referral.   If you have any questions, please contact APOLINAR Gordon

## 2024-11-14 LAB
ANION GAP SERPL CALC-SCNC: 6 MMOL/L (ref 0–18)
BUN BLD-MCNC: 37 MG/DL (ref 9–23)
CALCIUM BLD-MCNC: 9.1 MG/DL (ref 8.7–10.4)
CHLORIDE SERPL-SCNC: 118 MMOL/L (ref 98–112)
CO2 SERPL-SCNC: 22 MMOL/L (ref 21–32)
CREAT BLD-MCNC: 1.73 MG/DL
EGFRCR SERPLBLD CKD-EPI 2021: 39 ML/MIN/1.73M2 (ref 60–?)
ERYTHROCYTE [DISTWIDTH] IN BLOOD BY AUTOMATED COUNT: 14.2 %
GLUCOSE BLD-MCNC: 108 MG/DL (ref 70–99)
HCT VFR BLD AUTO: 32.6 %
HGB BLD-MCNC: 10.3 G/DL
MAGNESIUM SERPL-MCNC: 2.2 MG/DL (ref 1.6–2.6)
MCH RBC QN AUTO: 29.8 PG (ref 26–34)
MCHC RBC AUTO-ENTMCNC: 31.6 G/DL (ref 31–37)
MCV RBC AUTO: 94.2 FL
OSMOLALITY SERPL CALC.SUM OF ELEC: 311 MOSM/KG (ref 275–295)
PLATELET # BLD AUTO: 153 10(3)UL (ref 150–450)
POTASSIUM SERPL-SCNC: 4.2 MMOL/L (ref 3.5–5.1)
RBC # BLD AUTO: 3.46 X10(6)UL
SODIUM SERPL-SCNC: 146 MMOL/L (ref 136–145)
WBC # BLD AUTO: 5.3 X10(3) UL (ref 4–11)

## 2024-11-14 PROCEDURE — 99232 SBSQ HOSP IP/OBS MODERATE 35: CPT | Performed by: INTERNAL MEDICINE

## 2024-11-14 NOTE — SLP NOTE
SPEECH DAILY NOTE - INPATIENT    ASSESSMENT & PLAN   ASSESSMENT  Pt seen for dysphagia tx to assess tolerance with recommended diet, ensure proper utilization of aspiration precautions and provide pt/family education. Patient awake, alert, and pleasant. On room air. Patient responded verbally with short phrases/sentences appropriately.  Lunch meal present and trials of thin liquid administered by SLP.  Patient tolerated multiple PO trials of thin liquid via cup and straw sip intake with no overt clinical s/s aspiration.  Patient exhibited a suspected delayed however consistent pharyngeal swallow response with palpable laryngeal elevation.  Patient with slightly impulsive style feeding behavior however no overt clinical s/s aspiration observed.  Underlying cognitive deficits inherent with feeding therefore recommend supervision during PO intake.   Recommend continue regular diet texture with thin liquids (which is patient's baseline diet texture per transfer paperwork from SNF).  D/W PCT.  SLP to follow.       Diet Recommendations - Solids: Regular  Diet Recommendations - Liquids: Thin Liquids  Supervision during meals   Compensatory Strategies Recommended: Small bites and sips  Aspiration Precautions: Upright position  Medication Administration Recommendations: One pill at a time    Patient Experiencing Pain: No                Treatment Plan  Treatment Plan/Recommendations: Aspiration precautions    Interdisciplinary Communication: Disussed with other staff            GOALS  Goal #1 The patient will tolerate rregular consistency and mildly thick liquids without overt signs or symptoms of aspiration with 90 % accuracy over 1-2 session(s). Met   Goal #2 The patient/family/caregiver will demonstrate understanding and implementation of aspiration precautions and swallow strategies independently over 1-2 session(s).     In Progress   Goal #3 The patient will tolerate trial upgrade of NA consistency and thin liquids  without overt signs or symptoms of aspiration with 90 % accuracy over 1-2 session(s). In progress       FOLLOW UP  Follow Up Needed (Documentation Required): Yes  SLP Follow-up Date: 11/15/24       Session: 1    If you have any questions, please contact APOLINAR Guerra, MS CCC-SLP/L, pager [phone]  Speech-LanguagePathologist

## 2024-11-14 NOTE — PROGRESS NOTES
Patient is more alert today compared to yesterday. Oriented to self only. No complaints of pain. IV ABX and IVF as ordered. Had a better appetite today. Rounded on Q hour and PRN.

## 2024-11-14 NOTE — PLAN OF CARE
Patient is alert. Maintaining O2 on RA. Tele, SB. No c/o pain.  PIV IVF,ABX. Regular diet/NTL. No further needs at this time. Continue POC. Safety precaution in place.   Problem: Patient/Family Goals  Goal: Patient/Family Long Term Goal  Description: Patient's Long Term Goal: discharge home    Interventions:  - Consult to Hosp  - IVF, IV Abx  - Maintain stable BP  - NPO until speech eval  - PT/OT eval   - See additional Care Plan goals for specific interventions  Outcome: Progressing  Goal: Patient/Family Short Term Goal  Description: Patient's Short Term Goal: maintain stable BP   11/13;manage sleep,monitor for SB    Interventions:   - Consult to Hosp  - IVF, IV Abx  - NPO until speech eval  - PT/OT eval  - Pain management   - See additional Care Plan goals for specific interventions  Outcome: Progressing     Problem: PAIN - ADULT  Goal: Verbalizes/displays adequate comfort level or patient's stated pain goal  Description: INTERVENTIONS:  - Encourage pt to monitor pain and request assistance  - Assess pain using appropriate pain scale  - Administer analgesics based on type and severity of pain and evaluate response  - Implement non-pharmacological measures as appropriate and evaluate response  - Consider cultural and social influences on pain and pain management  - Manage/alleviate anxiety  - Utilize distraction and/or relaxation techniques  - Monitor for opioid side effects  - Notify MD/LIP if interventions unsuccessful or patient reports new pain  - Anticipate increased pain with activity and pre-medicate as appropriate  Outcome: Progressing     Problem: RISK FOR INFECTION - ADULT  Goal: Absence of fever/infection during anticipated neutropenic period  Description: INTERVENTIONS  - Monitor WBC  - Administer growth factors as ordered  - Implement neutropenic guidelines  Outcome: Progressing     Problem: SAFETY ADULT - FALL  Goal: Free from fall injury  Description: INTERVENTIONS:  - Assess pt frequently for  physical needs  - Identify cognitive and physical deficits and behaviors that affect risk of falls.  - [place] fall precautions as indicated by assessment.  - Educate pt/family on patient safety including physical limitations  - Instruct pt to call for assistance with activity based on assessment  - Modify environment to reduce risk of injury  - Provide assistive devices as appropriate  - Consider OT/PT consult to assist with strengthening/mobility  - Encourage toileting schedule  Outcome: Progressing     Problem: DISCHARGE PLANNING  Goal: Discharge to home or other facility with appropriate resources  Description: INTERVENTIONS:  - Identify barriers to discharge w/pt and caregiver  - Include patient/family/discharge partner in discharge planning  - Arrange for needed discharge resources and transportation as appropriate  - Identify discharge learning needs (meds, wound care, etc)  - Arrange for interpreters to assist at discharge as needed  - Consider post-discharge preferences of patient/family/discharge partner  - Complete POLST form as appropriate  - Assess patient's ability to be responsible for managing their own health  - Refer to Case Management Department for coordinating discharge planning if the patient needs post-hospital services based on physician/LIP order or complex needs related to functional status, cognitive ability or social support system  Outcome: Progressing

## 2024-11-15 VITALS
DIASTOLIC BLOOD PRESSURE: 80 MMHG | HEIGHT: 70.98 IN | OXYGEN SATURATION: 100 % | SYSTOLIC BLOOD PRESSURE: 142 MMHG | BODY MASS INDEX: 20.35 KG/M2 | RESPIRATION RATE: 17 BRPM | TEMPERATURE: 98 F | WEIGHT: 145.38 LBS | HEART RATE: 55 BPM

## 2024-11-15 LAB
ANION GAP SERPL CALC-SCNC: 5 MMOL/L (ref 0–18)
BUN BLD-MCNC: 29 MG/DL (ref 9–23)
CALCIUM BLD-MCNC: 8.6 MG/DL (ref 8.7–10.4)
CHLORIDE SERPL-SCNC: 113 MMOL/L (ref 98–112)
CO2 SERPL-SCNC: 24 MMOL/L (ref 21–32)
CREAT BLD-MCNC: 1.4 MG/DL
EGFRCR SERPLBLD CKD-EPI 2021: 50 ML/MIN/1.73M2 (ref 60–?)
GLUCOSE BLD-MCNC: 103 MG/DL (ref 70–99)
OSMOLALITY SERPL CALC.SUM OF ELEC: 300 MOSM/KG (ref 275–295)
POTASSIUM SERPL-SCNC: 3.9 MMOL/L (ref 3.5–5.1)
SODIUM SERPL-SCNC: 142 MMOL/L (ref 136–145)

## 2024-11-15 PROCEDURE — 99239 HOSP IP/OBS DSCHRG MGMT >30: CPT | Performed by: INTERNAL MEDICINE

## 2024-11-15 RX ORDER — CEPHALEXIN 500 MG/1
500 CAPSULE ORAL 3 TIMES DAILY
Qty: 9 CAPSULE | Refills: 0 | Status: SHIPPED | OUTPATIENT
Start: 2024-11-15 | End: 2024-11-18

## 2024-11-15 NOTE — CM/SW NOTE
11/15/24 1200   Discharge disposition   Expected discharge disposition Long Term [facility]   Post Acute Care Provider [facility]   Discharge transportation [facility]     Informed by RN that patient is medically cleared for discharge. Spoke with Charlotte from [facility] who confirmed patient able to return today. Spoke with [facility] and scheduled  for 2pm today. PCS form completed and available for RN to print. RN updating daughter. SW will remain available.      On license of [facility]  Phone: [phone]    [facility]  [phone] or [phone]      SAMUEL Menezes  Discharge Planner  [phone]

## 2024-11-15 NOTE — PLAN OF CARE
Patient alert to self. More alert than previously reported. Follows commands.   Room air. . On tele sinus meg. No complaints of chest pain   Incontinent x 2. Briefed and primo fit.  Reg diet. Reg solids and thin liquids   No c/o pain. PIV to the right forearm. D5 running at 75. IV abx  Stage one wound to the buttocks. Mepilex applied. Patient w/c bound   Consults:Hosp   Plan of care updated. Patient rounded on routinely       Problem: Patient/Family Goals  Goal: Patient/Family Long Term Goal  Description: Patient's Long Term Goal: discharge home    Interventions:  - Consult to Hosp  - IVF, IV Abx  - Maintain stable BP  - NPO until speech eval  - PT/OT eval   - See additional Care Plan goals for specific interventions  Outcome: Progressing  Goal: Patient/Family Short Term Goal  Description: Patient's Short Term Goal: maintain stable BP   11/13;manage sleep,monitor for SB  11/14 NOC: Maintain safety     Interventions:   - Consult to Hosp  - IVF, IV Abx  - NPO until speech eval  - PT/OT eval  - Pain management   - See additional Care Plan goals for specific interventions  Outcome: Progressing

## 2024-11-15 NOTE — DISCHARGE SUMMARY
[place] HOSPITALIST  DISCHARGE SUMMARY     Zachary Willson Patient Status:  Observation    1/3/1940 MRN [id number]   Location [facility] 5NW-A Attending No att. providers found   Hosp Day # 0 PCP King Bennett MD     Date of Admission: 2024  Date of Discharge:  11/15/2024     Discharge Disposition: SNF Long Term Care ([facility])    Discharge Diagnosis:  #Acute cystitis  -IV rocephin  -Follow blood cultures  -Ucx sent and pending but after initiation of antibiotics, will plan to treat for ~7 day course      #FELIX  #Hypernatremia  -due to poor enteral intake   -IVF - D5W     #COVID-19   -Out of quarantine at facility  -No need to treat      #Calcified pleural plaques ?asbestos related disease?   -discussed with daughter, can follow up but patient is clinically asymptomatic      #Abnormal brain CT, NPH cannot be excluded but suspect may be due to volume loss from prior CVA  #Cerebrovascular disease   -progressive decline over last several years since CVA in 2019 per family      #Dementia - fairly severe per daughter, Alert to person only   #HTN - PTA meds on hold  #HLD    History of Present Illness:      Zcahary Willson is a 84 year old male with history of dementia, HTN, HLD, CKD stage III, [facility] with hypotension.      Patient is unable to provide history so it was obtained from chart review. He resides at [facility]. He was recently dx with COVID-19 and was under quarantine at facility. Today patient found unresponsive and hypotensive to SBP 50s so EMS were called.      ED work up with hypotension that was responsive to fluids. Blood work with hypernatremia 147, Scr 2.88, lactic acid 2.2. CBC with hgb 11.3. UA concerning for infection. He is COVID-19 positive. CXR without acute findings. CT brain with ventriculomegaly increased given degree of volume loss, NPH cannot be excluded. He was given IV rocephin, IVF and admitted.     Brief Synopsis:  Patient was admitted, he was treated with antibiotics and IV fluids, urine culture returned no growth but was not sent on admission and felt to be falsely negative so he will continue course of antibiotics, he was discharged in stable condition.    Lace+ Score: 47  59-90 High Risk  29-58 Medium Risk  0-28   Low Risk       TCM Follow-Up Recommendation:  LACE 29-58: Moderate Risk of readmission after discharge from the hospital.      Procedures during hospitalization:   N/a    Incidental or significant findings and recommendations (brief descriptions):  See above    Lab/Test results pending at Discharge:   N/a    Consultants:  N/a    Discharge Medication List:     Discharge Medications        START taking these medications        Instructions Prescription details   cephALEXin 500 MG Caps  Commonly known as: Keflex      Take 1 capsule (500 mg total) by mouth 3 (three) times daily for 3 days.   Stop taking on: November 18, 2024  Quantity: 9 capsule  Refills: 0            CONTINUE taking these medications        Instructions Prescription details   aspirin 81 MG Tbec      Take 1 tablet (81 mg total) by mouth daily.   Refills: 0     atorvastatin 40 MG Tabs  Commonly known as: Lipitor      Take 1 tablet (40 mg total) by mouth nightly.   Refills: 0     cyanocobalamin 1000 MCG Tabs  Commonly known as: Vitamin B12      Take 1 tablet (1,000 mcg total) by mouth daily.   Refills: 0     lisinopril 20 MG Tabs  Commonly known as: Prinivil; Zestril      Take 1 tablet (20 mg total) by mouth daily.   Refills: 0            STOP taking these medications      amLODIPine Besy-Benazepril HCl 5-10 MG Caps  Commonly known as: LOTREL        furosemide 40 MG Tabs  Commonly known as: Lasix        hydrALAZINE 50 MG Tabs  Commonly known as: Apresoline        potassium chloride 20 MEQ Pack  Commonly known as: Klor-Con                  Where to Get Your Medications        Please  your prescriptions at the location directed by your doctor or  nurse    Bring a paper prescription for each of these medications  cephALEXin 500 MG Caps         [place]    Follow-up appointment:   King Bennett MD  [address] [facility] [address]  [phone]    Follow up      Appointments for Next 30 Days 11/15/2024 - 12/15/2024      None            Vital signs:  Temp:  [97.6 °F (36.4 °C)-98.4 °F (36.9 °C)] 97.9 °F (36.6 °C)  Pulse:  [48-60] 55  Resp:  [17-18] 17  BP: (135-143)/(61-80) 142/80  SpO2:  [97 %-100 %] 100 %    Physical Exam:    General: No acute distress   Lungs: clear to auscultation  Cardiovascular: S1, S2  Abdomen: Soft      -----------------------------------------------------------------------------------------------  PATIENT DISCHARGE INSTRUCTIONS: See electronic chart    Berta Taylor DO    Total time spent on discharge plannin minutes     The  [employer] Cures Act makes medical notes like these available to patients in the interest of transparency. Please be advised this is a medical document. Medical documents are intended to carry relevant information, facts as evident, and the clinical opinion of the practitioner. The medical note is intended as peer to peer communication and may appear blunt or direct. It is written in medical language and may contain abbreviations or verbiage that are unfamiliar.

## 2024-11-15 NOTE — PLAN OF CARE
AAOX1- Hx. Dementia. RA. Tele SB. Incontinent X2. Mepilex to sacrum. Wheelchair bound. Regular diet- supervised feeds. Pills in applesauce. Rocephin. Patient rounded on routinely. Updated on plan of care.       Problem: Patient/Family Goals  Goal: Patient/Family Long Term Goal  Description: Patient's Long Term Goal: discharge home    Interventions:  - Consult to Hosp  - IVF, IV Abx  - Maintain stable BP  - NPO until speech eval  - PT/OT eval   - See additional Care Plan goals for specific interventions  Outcome: Progressing  Goal: Patient/Family Short Term Goal  Description: Patient's Short Term Goal: maintain stable BP   11/13;manage sleep,monitor for SB  11/14 NOC: Maintain safety     Interventions:   - Consult to Hosp  - IVF, IV Abx  - NPO until speech eval  - PT/OT eval  - Pain management   - See additional Care Plan goals for specific interventions  Outcome: Progressing     Problem: PAIN - ADULT  Goal: Verbalizes/displays adequate comfort level or patient's stated pain goal  Description: INTERVENTIONS:  - Encourage pt to monitor pain and request assistance  - Assess pain using appropriate pain scale  - Administer analgesics based on type and severity of pain and evaluate response  - Implement non-pharmacological measures as appropriate and evaluate response  - Consider cultural and social influences on pain and pain management  - Manage/alleviate anxiety  - Utilize distraction and/or relaxation techniques  - Monitor for opioid side effects  - Notify MD/LIP if interventions unsuccessful or patient reports new pain  - Anticipate increased pain with activity and pre-medicate as appropriate  Outcome: Progressing     Problem: RISK FOR INFECTION - ADULT  Goal: Absence of fever/infection during anticipated neutropenic period  Description: INTERVENTIONS  - Monitor WBC  - Administer growth factors as ordered  - Implement neutropenic guidelines  Outcome: Progressing     Problem: SAFETY ADULT - FALL  Goal: Free from fall  injury  Description: INTERVENTIONS:  - Assess pt frequently for physical needs  - Identify cognitive and physical deficits and behaviors that affect risk of falls.  - [place] fall precautions as indicated by assessment.  - Educate pt/family on patient safety including physical limitations  - Instruct pt to call for assistance with activity based on assessment  - Modify environment to reduce risk of injury  - Provide assistive devices as appropriate  - Consider OT/PT consult to assist with strengthening/mobility  - Encourage toileting schedule  Outcome: Progressing     Problem: DISCHARGE PLANNING  Goal: Discharge to home or other facility with appropriate resources  Description: INTERVENTIONS:  - Identify barriers to discharge w/pt and caregiver  - Include patient/family/discharge partner in discharge planning  - Arrange for needed discharge resources and transportation as appropriate  - Identify discharge learning needs (meds, wound care, etc)  - Arrange for interpreters to assist at discharge as needed  - Consider post-discharge preferences of patient/family/discharge partner  - Complete POLST form as appropriate  - Assess patient's ability to be responsible for managing their own health  - Refer to Case Management Department for coordinating discharge planning if the patient needs post-hospital services based on physician/LIP order or complex needs related to functional status, cognitive ability or social support system  Outcome: Progressing

## 2024-11-15 NOTE — PLAN OF CARE
NURSING DISCHARGE NOTE    Discharged Nursing home via ambulance.  Accompanied by Support staff  Belongings Taken by patient/family.    Patient discharged in stable condition. Cleared for discharge by hospitalist. IV and Tele removed. Bedside belongings returned to patient. Patient returning to [facility]. Report called to Marlyn GUTIERREZ. Spoke to daughter to update her on discharge plan. Paperwork sent with patient including script for antibiotic.

## 2024-11-15 NOTE — DIETARY NOTE
[facility]   part of [facility]  NUTRITION ASSESSMENT    Unable to diagnose malnutrition criteria at this time.    NUTRITION INTERVENTION:    RD nutrition Care Plan- See RD nutrition assessment for additional recommendations  Meal and Snacks - Continue Regular Diet as tolerated; Monitor and encourage adequate PO intake.   Medical Food Supplements - Continue Magic Cup BID. Rationale/use for oral supplements discussed.  Vitamin and Mineral Supplements - Continue Multivitamin with minerals    PATIENT STATUS: 11/15: Unable to visit at bedside d/t isolation for COVID-19. Pt remains AAOx1 which is his baseline. Pt with good appetite/PO intake. No GI symptoms noted but last BM PTA. SLP following and recommends regular solids and thin liquids. Noted plan to discharge today. Will continue to monitor and follow up as appropriate.    11/13: Pt is an 84 year old male admitted with sepsis. RD screened due to wound. No family present upon visit. +dementia. Pt confused. Coccyx wound, unstaged. RD added Magic Cup Bid. Pt with wasting present. No wt history or po info available. RD to follow for po, ons, wts.     PMH: HTN, dementia, CKD stage 3, prostate cancer    ANTHROPOMETRICS:  Ht: 180.3 cm (5' 10.98\")  Wt: 66 kg (145 lb 6.4 oz).   BMI: Body mass index is 20.29 kg/m².  IBW: 76 kg    WEIGHT HISTORY:   Wt Readings from Last 10 Encounters:   11/12/24 66 kg (145 lb 6.4 oz)      NUTRITION:  Diet:       Procedures    Regular/General diet Fluid Consistency: Thin Liquids ; Texture Consistency: Regular; Is Patient on Accuchecks? No      Food Allergies: No  Cultural/Ethnic/Holiness Preferences Addressed: Yes    Percent Meals Eaten (last 3 days)       Date/Time Percent Meals Eaten (%)    11/15/24 0824 100 %          GI system review: constipation; last BM PTA  Skin and wounds: coccyx stage II    NUTRITION RELATED PHYSICAL FINDINGS:     1. Body Fat/Muscle Mass: moderate depletion body fat Orbital fat pad, Buccal fat pad, and  Midaxillary line and moderate muscle depletion Temple region and Clavicle region     2. Fluid Accumulation: generalized edema    NUTRITION PRESCRIPTION:  66 kg Actual Body Weight  Calories: 7195-6142 calories/day (30-35 kcal/kg)  Protein: 79-99 grams protein/day (1.2-1.5 grams protein per kg)  Fluid: ~1 ml/kcal or per MD discretion    NUTRITION DIAGNOSIS/PROBLEM:  Increased nutrient needs related to physiological causes and increased nutritional demands for healing as evidenced by loss of fat mass, loss of muscle mass, and wound    MONITOR AND EVALUATE/NUTRITION GOALS:  PO intake of 75% of meals TID - Met, continues  PO intake of 75% of oral nutrition supplement/s - Met, continues  Weight stable within 1 to 2 lbs during admission - Ongoing    MEDICATIONS:  Abx, multivitamin    LABS:  Reviewed    Pt is at Moderate nutrition risk    Penny Ly RD, LDN, CNSC  Clinical Dietitian  Spectra: [phone]

## 2024-11-15 NOTE — SLP NOTE
SPEECH DAILY NOTE - INPATIENT    ASSESSMENT & PLAN   ASSESSMENT  Pt seen for dysphagia tx to assess tolerance with recommended diet, ensure proper utilization of aspiration precautions and provide pt/family education. Patient awake, alert, and verbally responsive. Patient tolerated trials of regular solid texture with thin liquids with no overt or immediate clinical s/s aspiration observed or suspected.  Patient with overall good PO intake.  Underlying cognitive deficits inherent with feeding therefore recommend supervision during PO intake.   Recommend continue regular diet texture with thin liquids (which is patient's baseline diet texture per transfer paperwork from SNF).  D/W RN.         Diet Recommendations - Solids: Regular  Diet Recommendations - Liquids: Thin Liquids  Supervision during meals  Compensatory Strategies Recommended: Small bites and sips  Aspiration Precautions: Upright position  Medication Administration Recommendations: One pill at a time    Patient Experiencing Pain: No        Treatment Plan  Treatment Plan/Recommendations: No further inpatient SLP service warranted    Interdisciplinary Communication: Discussed with RN            GOALS  Goal #1 The patient will tolerate rregular consistency and mildly thick liquids without overt signs or symptoms of aspiration with 90 % accuracy over 1-2 session(s). Met   Goal #2 The patient/family/caregiver will demonstrate understanding and implementation of aspiration precautions and swallow strategies independently over 1-2 session(s).    Met   Goal #3 The patient will tolerate trial upgrade of NA consistency and thin liquids without overt signs or symptoms of aspiration with 90 % accuracy over 1-2 session(s). Met       FOLLOW UP  Follow Up Needed (Documentation Required): No  SLP Follow-up Date: 11/15/24       Session: 2    If you have any questions, please contact APOLINAR Guerra, MS CCC-SLP/L, pager [phone]  Speech-LanguagePathologist

## 2024-11-15 NOTE — PLAN OF CARE
Problem: Patient/Family Goals  Goal: Patient/Family Long Term Goal  Description: Patient's Long Term Goal: discharge home    Interventions:  - Consult to Hosp  - IVF, IV Abx  - Maintain stable BP  - NPO until speech eval  - PT/OT eval   - See additional Care Plan goals for specific interventions  11/15/2024 1336 by Dahiana Yancey RN  Outcome: Completed  11/15/2024 1126 by Dahiana Yancey RN  Outcome: Progressing  Goal: Patient/Family Short Term Goal  Description: Patient's Short Term Goal: maintain stable BP   11/13;manage sleep,monitor for SB  11/14 NOC: Maintain safety     Interventions:   - Consult to Hosp  - IVF, IV Abx  - NPO until speech eval  - PT/OT eval  - Pain management   - See additional Care Plan goals for specific interventions  11/15/2024 1336 by Dahiana Yancey RN  Outcome: Completed  11/15/2024 1126 by Dahiana Yancey RN  Outcome: Progressing     Problem: PAIN - ADULT  Goal: Verbalizes/displays adequate comfort level or patient's stated pain goal  Description: INTERVENTIONS:  - Encourage pt to monitor pain and request assistance  - Assess pain using appropriate pain scale  - Administer analgesics based on type and severity of pain and evaluate response  - Implement non-pharmacological measures as appropriate and evaluate response  - Consider cultural and social influences on pain and pain management  - Manage/alleviate anxiety  - Utilize distraction and/or relaxation techniques  - Monitor for opioid side effects  - Notify MD/LIP if interventions unsuccessful or patient reports new pain  - Anticipate increased pain with activity and pre-medicate as appropriate  11/15/2024 1336 by Dahiana Yancey RN  Outcome: Completed  11/15/2024 1126 by Dahiana Yancey RN  Outcome: Progressing     Problem: RISK FOR INFECTION - ADULT  Goal: Absence of fever/infection during anticipated neutropenic period  Description: INTERVENTIONS  - Monitor WBC  - Administer growth factors as ordered  - Implement neutropenic  guidelines  11/15/2024 1336 by Dahiana Yancey RN  Outcome: Completed  11/15/2024 1126 by Dahiana Yancey RN  Outcome: Progressing     Problem: SAFETY ADULT - FALL  Goal: Free from fall injury  Description: INTERVENTIONS:  - Assess pt frequently for physical needs  - Identify cognitive and physical deficits and behaviors that affect risk of falls.  - [place]ard fall precautions as indicated by assessment.  - Educate pt/family on patient safety including physical limitations  - Instruct pt to call for assistance with activity based on assessment  - Modify environment to reduce risk of injury  - Provide assistive devices as appropriate  - Consider OT/PT consult to assist with strengthening/mobility  - Encourage toileting schedule  11/15/2024 1336 by Dahiana Yancey RN  Outcome: Completed  11/15/2024 1126 by Dahiana Yancey RN  Outcome: Progressing     Problem: DISCHARGE PLANNING  Goal: Discharge to home or other facility with appropriate resources  Description: INTERVENTIONS:  - Identify barriers to discharge w/pt and caregiver  - Include patient/family/discharge partner in discharge planning  - Arrange for needed discharge resources and transportation as appropriate  - Identify discharge learning needs (meds, wound care, etc)  - Arrange for interpreters to assist at discharge as needed  - Consider post-discharge preferences of patient/family/discharge partner  - Complete POLST form as appropriate  - Assess patient's ability to be responsible for managing their own health  - Refer to Case Management Department for coordinating discharge planning if the patient needs post-hospital services based on physician/LIP order or complex needs related to functional status, cognitive ability or social support system  11/15/2024 1336 by Dahiana Yancey, RN  Outcome: Completed  11/15/2024 1126 by Dahiana Yancey RN  Outcome: Progressing